# Patient Record
Sex: MALE | Race: WHITE | Employment: OTHER | URBAN - METROPOLITAN AREA
[De-identification: names, ages, dates, MRNs, and addresses within clinical notes are randomized per-mention and may not be internally consistent; named-entity substitution may affect disease eponyms.]

---

## 2017-11-13 NOTE — PATIENT DISCUSSION
Pt edu & a that in order to achieve best visual acuity pt should consider cataract surgery as gls rx will not achieve the best visual acuity anymore.

## 2018-10-02 NOTE — PATIENT DISCUSSION
Signs of ocular involvement. Viroptic Q2hrs for 1 week then QID for 1 week, cold compresses prn and AT prn.

## 2018-10-04 NOTE — PATIENT DISCUSSION
Dendrites improving. Continue Viroptic Q2hrs for 1 week then QID for 1 week, cold compresses prn and AT prn.

## 2018-10-11 NOTE — PATIENT DISCUSSION
Dendrites resolved but some scarring starting to form. Continue Viroptic QID OD and add lotemax BId OD.

## 2022-02-04 NOTE — PATIENT DISCUSSION
SIGNIFICANT DERMATOCHALASIS BOTH UPPER EYELIDS; RECOMMEND UPPER EYELID BLEPHAROPLASTY, OU.  I have examined the patient and reviewed  the photos and visual fields.  The excess upper eyelid tissue folds downward towards or onto the  lid margin causing impairment of the peripheral visual field.  The visual field improves by lifting this tissue which is supported by evidence of taped visual fields.  I have discussed with the patient the option of blepharoplasty surgery to improve the functional visual field.  We have discussed the risks and benefits of the surgery at length as well as the location of the incision and the recovery process.  The patient understands this discussion, has had all questions answered and desires to proceed with blepharoplasty surgery as explained.

## 2022-05-04 ENCOUNTER — ESTABLISHED PATIENT (OUTPATIENT)
Dept: URBAN - METROPOLITAN AREA EYE CENTER 2 | Facility: EYE CENTER | Age: 77
End: 2022-05-04

## 2022-05-04 DIAGNOSIS — H40.1132: ICD-10-CM

## 2022-05-04 PROCEDURE — 92012 INTRM OPH EXAM EST PATIENT: CPT

## 2022-05-04 ASSESSMENT — TONOMETRY
OD_IOP_MMHG: 13
OS_IOP_MMHG: 15

## 2022-05-04 ASSESSMENT — VISUAL ACUITY
OS_SC: 20/20-1
OD_SC: 20/20-3

## 2022-05-04 NOTE — PATIENT DISCUSSION
IOP is normal for disc . continue current meds Timolol bid ou , Latanoprost qhs ou . 24-2 visual field and c/d oct ordered .

## 2022-06-28 RX ORDER — BENAZEPRIL HYDROCHLORIDE 20 MG/1
TABLET ORAL
COMMUNITY
End: 2022-08-30 | Stop reason: SDUPTHER

## 2022-06-28 RX ORDER — UBIDECARENONE 100 MG
CAPSULE ORAL
COMMUNITY

## 2022-06-28 RX ORDER — ROSUVASTATIN CALCIUM 10 MG/1
TABLET, COATED ORAL
COMMUNITY
End: 2022-08-30 | Stop reason: SDUPTHER

## 2022-06-28 RX ORDER — LATANOPROST 50 UG/ML
SOLUTION/ DROPS OPHTHALMIC
COMMUNITY

## 2022-06-28 RX ORDER — OFLOXACIN 3 MG/ML
SOLUTION/ DROPS OPHTHALMIC
COMMUNITY

## 2022-06-28 RX ORDER — HYDROCODONE BITARTRATE AND ACETAMINOPHEN 5; 325 MG/1; MG/1
TABLET ORAL
COMMUNITY
End: 2022-10-21

## 2022-06-28 RX ORDER — BENZONATATE 100 MG/1
1 CAPSULE ORAL
COMMUNITY
End: 2022-10-21

## 2022-06-28 RX ORDER — AZELASTINE HCL 205.5 UG/1
SPRAY NASAL
COMMUNITY

## 2022-06-28 RX ORDER — BETHANECHOL CHLORIDE 25 MG/1
TABLET ORAL
COMMUNITY
End: 2022-10-21

## 2022-06-28 RX ORDER — ONDANSETRON 4 MG/1
TABLET, ORALLY DISINTEGRATING ORAL
COMMUNITY
End: 2022-09-20

## 2022-06-28 RX ORDER — FLUTICASONE PROPIONATE 50 MCG
SPRAY, SUSPENSION (ML) NASAL
COMMUNITY

## 2022-06-28 RX ORDER — OMEPRAZOLE 40 MG/1
1 CAPSULE, DELAYED RELEASE ORAL
COMMUNITY
End: 2022-09-20

## 2022-09-07 ENCOUNTER — ESTABLISHED PATIENT (OUTPATIENT)
Dept: URBAN - METROPOLITAN AREA CLINIC 8 | Facility: CLINIC | Age: 77
End: 2022-09-07

## 2022-09-07 DIAGNOSIS — H40.1132: ICD-10-CM

## 2022-09-07 DIAGNOSIS — D23.111: ICD-10-CM

## 2022-09-07 PROCEDURE — 92012 INTRM OPH EXAM EST PATIENT: CPT

## 2022-09-07 ASSESSMENT — VISUAL ACUITY
OD_SC: 20/20
OU_SC: 20/20
OS_SC: 20/20

## 2022-09-07 ASSESSMENT — TONOMETRY
OD_IOP_MMHG: 13
OS_IOP_MMHG: 17

## 2022-09-19 PROBLEM — D72.821 MONOCYTOSIS: Status: ACTIVE | Noted: 2022-09-19

## 2022-09-19 PROBLEM — R05.3 CHRONIC COUGH: Status: ACTIVE | Noted: 2022-09-19

## 2022-09-19 PROBLEM — D64.9 ANEMIA: Status: ACTIVE | Noted: 2022-09-19

## 2022-09-19 PROBLEM — F41.8 ANXIOUS DEPRESSION: Status: ACTIVE | Noted: 2022-09-19

## 2022-09-19 PROBLEM — G62.9 PERIPHERAL NEUROPATHY: Status: ACTIVE | Noted: 2022-09-19

## 2022-09-19 PROBLEM — G47.33 OSA (OBSTRUCTIVE SLEEP APNEA): Status: ACTIVE | Noted: 2022-09-19

## 2022-09-19 PROBLEM — R73.03 PREDIABETES: Status: ACTIVE | Noted: 2022-09-19

## 2022-09-19 PROBLEM — E78.2 MIXED HYPERLIPIDEMIA: Status: ACTIVE | Noted: 2022-09-19

## 2022-09-19 PROBLEM — E88.9 DISORDER OF METABOLISM: Status: ACTIVE | Noted: 2022-09-19

## 2022-09-19 PROBLEM — D69.6 THROMBOCYTOPENIA (HCC): Status: ACTIVE | Noted: 2022-09-19

## 2022-09-19 PROBLEM — R89.9 ABNORMAL LABORATORY TEST RESULT: Status: ACTIVE | Noted: 2022-09-19

## 2022-09-19 PROBLEM — K63.5 COLON POLYPS: Status: ACTIVE | Noted: 2022-09-19

## 2022-09-19 PROBLEM — R74.8 ABNORMAL LIVER ENZYMES: Status: ACTIVE | Noted: 2022-09-19

## 2022-09-19 PROBLEM — J32.0 MAXILLARY SINUSITIS: Status: ACTIVE | Noted: 2022-09-19

## 2022-09-19 PROBLEM — J30.9 ALLERGIC RHINITIS: Status: ACTIVE | Noted: 2022-09-19

## 2022-09-19 PROBLEM — I10 ESSENTIAL HYPERTENSION: Status: ACTIVE | Noted: 2022-09-19

## 2022-09-19 PROBLEM — K21.9 GERD WITHOUT ESOPHAGITIS: Status: ACTIVE | Noted: 2022-09-19

## 2022-09-19 PROBLEM — E11.9 TYPE 2 DIABETES MELLITUS WITHOUT COMPLICATION (HCC): Status: ACTIVE | Noted: 2022-09-19

## 2022-09-19 PROBLEM — E66.9 OBESITY (BMI 30-39.9): Status: ACTIVE | Noted: 2022-09-19

## 2022-09-19 PROBLEM — H91.8X3 OTHER SPECIFIED HEARING LOSS, BILATERAL: Status: ACTIVE | Noted: 2022-09-19

## 2022-10-25 PROBLEM — F03.90 DEMENTIA WITHOUT BEHAVIORAL DISTURBANCE (HCC): Status: ACTIVE | Noted: 2022-10-25

## 2022-10-25 PROBLEM — E53.8 B12 DEFICIENCY: Status: ACTIVE | Noted: 2022-10-25

## 2022-10-25 PROBLEM — R06.02 SOB (SHORTNESS OF BREATH): Status: ACTIVE | Noted: 2022-10-25

## 2022-10-25 PROBLEM — M79.605 LEFT LEG PAIN: Status: ACTIVE | Noted: 2022-10-25

## 2023-01-11 ENCOUNTER — ESTABLISHED PATIENT (OUTPATIENT)
Dept: URBAN - METROPOLITAN AREA CLINIC 8 | Facility: CLINIC | Age: 78
End: 2023-01-11

## 2023-01-11 DIAGNOSIS — H40.1132: ICD-10-CM

## 2023-01-11 DIAGNOSIS — D23.111: ICD-10-CM

## 2023-01-11 PROCEDURE — 92012 INTRM OPH EXAM EST PATIENT: CPT

## 2023-01-11 PROCEDURE — 92133 CPTRZD OPH DX IMG PST SGM ON: CPT

## 2023-01-11 ASSESSMENT — VISUAL ACUITY
OD_CC: 20/20-1
OS_CC: 20/20-1
OU_CC: 20/20

## 2023-01-11 ASSESSMENT — TONOMETRY
OD_IOP_MMHG: 14
OS_IOP_MMHG: 19

## 2023-01-11 NOTE — PATIENT DISCUSSION
Got OCT today. Looks stable. Get  at Dr Elinor Foreman soon. Re check 4 months. Scribe Attestation (For Scribes USE Only)... Attending Attestation (For Attendings USE Only).../Scribe Attestation (For Scribes USE Only)...

## 2023-05-15 ENCOUNTER — COMPREHENSIVE EXAM (OUTPATIENT)
Dept: URBAN - METROPOLITAN AREA CLINIC 8 | Facility: CLINIC | Age: 78
End: 2023-05-15

## 2023-05-15 DIAGNOSIS — H43.393: ICD-10-CM

## 2023-05-15 DIAGNOSIS — H40.1132: ICD-10-CM

## 2023-05-15 PROCEDURE — 92014 COMPRE OPH EXAM EST PT 1/>: CPT

## 2023-05-15 ASSESSMENT — VISUAL ACUITY
OS_CC: 20/20
OD_CC: 20/20

## 2023-05-15 ASSESSMENT — TONOMETRY
OD_IOP_MMHG: 17
OS_IOP_MMHG: 18

## 2024-06-04 ENCOUNTER — FOLLOW UP (OUTPATIENT)
Dept: URBAN - METROPOLITAN AREA CLINIC 17 | Facility: CLINIC | Age: 79
End: 2024-06-04

## 2024-06-04 DIAGNOSIS — H40.1132: ICD-10-CM

## 2024-06-04 PROCEDURE — 92133 CPTRZD OPH DX IMG PST SGM ON: CPT

## 2024-06-04 PROCEDURE — 99214 OFFICE O/P EST MOD 30 MIN: CPT

## 2024-06-04 PROCEDURE — 92020 GONIOSCOPY: CPT

## 2024-06-04 ASSESSMENT — VISUAL ACUITY
OU_CC: 20/20
OD_CC: 20/20-1
OS_CC: 20/20

## 2024-06-04 ASSESSMENT — TONOMETRY
OS_IOP_MMHG: 12
OD_IOP_MMHG: 12

## 2025-01-14 ENCOUNTER — FOLLOW UP (OUTPATIENT)
Age: 80
End: 2025-01-14

## 2025-01-14 DIAGNOSIS — H43.393: ICD-10-CM

## 2025-01-14 DIAGNOSIS — H40.1132: ICD-10-CM

## 2025-01-14 PROCEDURE — 99213 OFFICE O/P EST LOW 20 MIN: CPT

## 2025-01-14 PROCEDURE — 92134 CPTRZ OPH DX IMG PST SGM RTA: CPT | Mod: NC

## 2025-01-14 PROCEDURE — 92133 CPTRZD OPH DX IMG PST SGM ON: CPT

## 2025-07-14 ENCOUNTER — COMPREHENSIVE EXAM (OUTPATIENT)
Age: 80
End: 2025-07-14

## 2025-07-14 DIAGNOSIS — H40.1132: ICD-10-CM

## 2025-07-14 PROCEDURE — 92015 DETERMINE REFRACTIVE STATE: CPT

## 2025-07-14 PROCEDURE — 99214 OFFICE O/P EST MOD 30 MIN: CPT
